# Patient Record
Sex: FEMALE | Race: WHITE | NOT HISPANIC OR LATINO | Employment: OTHER | URBAN - METROPOLITAN AREA
[De-identification: names, ages, dates, MRNs, and addresses within clinical notes are randomized per-mention and may not be internally consistent; named-entity substitution may affect disease eponyms.]

---

## 2017-04-24 ENCOUNTER — ANESTHESIA EVENT (OUTPATIENT)
Dept: PERIOP | Facility: HOSPITAL | Age: 77
End: 2017-04-24

## 2017-04-24 ENCOUNTER — APPOINTMENT (OUTPATIENT)
Dept: PREADMISSION TESTING | Facility: HOSPITAL | Age: 77
End: 2017-04-24

## 2017-04-24 ENCOUNTER — HOSPITAL ENCOUNTER (OUTPATIENT)
Dept: GENERAL RADIOLOGY | Facility: HOSPITAL | Age: 77
Discharge: HOME | End: 2017-04-24
Admitting: UROLOGY

## 2017-04-24 VITALS — BODY MASS INDEX: 21.44 KG/M2 | HEIGHT: 66 IN | WEIGHT: 133.38 LBS

## 2017-04-24 LAB
ANION GAP SERPL CALCULATED.3IONS-SCNC: 5 MMOL/L (ref 3–11)
APTT PPP: 27.8 SECONDS (ref 24–31)
BUN BLD-MCNC: 19 MG/DL (ref 9–23)
BUN/CREAT SERPL: 23.8 (ref 7–25)
CALCIUM SPEC-SCNC: 10.3 MG/DL (ref 8.7–10.4)
CHLORIDE SERPL-SCNC: 98 MMOL/L (ref 99–109)
CO2 SERPL-SCNC: 35 MMOL/L (ref 20–31)
CREAT BLD-MCNC: 0.8 MG/DL (ref 0.6–1.3)
DEPRECATED RDW RBC AUTO: 43.4 FL (ref 37–54)
ERYTHROCYTE [DISTWIDTH] IN BLOOD BY AUTOMATED COUNT: 12.5 % (ref 11.3–14.5)
GFR SERPL CREATININE-BSD FRML MDRD: 70 ML/MIN/1.73
GLUCOSE BLD-MCNC: 91 MG/DL (ref 70–100)
HCT VFR BLD AUTO: 41.9 % (ref 34.5–44)
HGB BLD-MCNC: 14.1 G/DL (ref 11.5–15.5)
INR PPP: 0.98
MCH RBC QN AUTO: 31.9 PG (ref 27–31)
MCHC RBC AUTO-ENTMCNC: 33.7 G/DL (ref 32–36)
MCV RBC AUTO: 94.8 FL (ref 80–99)
PLATELET # BLD AUTO: 301 10*3/MM3 (ref 150–450)
PMV BLD AUTO: 9.5 FL (ref 6–12)
POTASSIUM BLD-SCNC: 4.4 MMOL/L (ref 3.5–5.5)
PROTHROMBIN TIME: 10.7 SECONDS (ref 9.6–11.5)
RBC # BLD AUTO: 4.42 10*6/MM3 (ref 3.89–5.14)
SODIUM BLD-SCNC: 138 MMOL/L (ref 132–146)
WBC NRBC COR # BLD: 8.24 10*3/MM3 (ref 3.5–10.8)

## 2017-04-24 PROCEDURE — 71020 HC CHEST PA AND LATERAL: CPT

## 2017-04-24 PROCEDURE — 36415 COLL VENOUS BLD VENIPUNCTURE: CPT

## 2017-04-24 PROCEDURE — 85730 THROMBOPLASTIN TIME PARTIAL: CPT | Performed by: UROLOGY

## 2017-04-24 PROCEDURE — 85610 PROTHROMBIN TIME: CPT | Performed by: UROLOGY

## 2017-04-24 PROCEDURE — 80048 BASIC METABOLIC PNL TOTAL CA: CPT | Performed by: UROLOGY

## 2017-04-24 PROCEDURE — 85027 COMPLETE CBC AUTOMATED: CPT | Performed by: UROLOGY

## 2017-04-24 RX ORDER — METOPROLOL SUCCINATE 25 MG/1
25 TABLET, EXTENDED RELEASE ORAL DAILY
COMMUNITY

## 2017-04-24 RX ORDER — MV-MN/OM3/DHA/EPA/FISH/LUT/ZEA 250-5-1 MG
1 CAPSULE ORAL DAILY
COMMUNITY

## 2017-04-24 NOTE — PAT
Instructed pt x2 that she would need to remove her ring after she told me she was unable to remove it.  Stressed to her that the ring can no longer be taped like had been done in the past.  Reinforced that the ring would be cut off her finger if needed.  Pt verbalized understanding and stated she would try one more time tonight after her shower.    MEASURED FOR TEDS/SCDS IN PAT    CALF MEASUREMENT    13.5  LENGTH MEASUREMENT 16

## 2017-04-25 ENCOUNTER — ANESTHESIA (OUTPATIENT)
Dept: PERIOP | Facility: HOSPITAL | Age: 77
End: 2017-04-25

## 2017-04-25 ENCOUNTER — HOSPITAL ENCOUNTER (OUTPATIENT)
Facility: HOSPITAL | Age: 77
Discharge: HOME | End: 2017-04-26
Attending: UROLOGY | Admitting: UROLOGY

## 2017-04-25 PROBLEM — Z98.890 H/O VAGINAL SURGERY: Status: ACTIVE | Noted: 2017-04-25

## 2017-04-25 PROBLEM — I34.1 MVP (MITRAL VALVE PROLAPSE): Status: ACTIVE | Noted: 2017-04-25

## 2017-04-25 PROBLEM — N81.10 VAGINAL PROLAPSE: Status: ACTIVE | Noted: 2017-04-25

## 2017-04-25 PROCEDURE — 25010000002 BUPRENORPHINE PER 0.1 MG: Performed by: NURSE ANESTHETIST, CERTIFIED REGISTERED

## 2017-04-25 PROCEDURE — 25010000002 CEFOXITIN PER 1 G: Performed by: UROLOGY

## 2017-04-25 PROCEDURE — C1763 CONN TISS, NON-HUMAN: HCPCS | Performed by: UROLOGY

## 2017-04-25 PROCEDURE — 25010000002 DEXAMETHASONE PER 1 MG: Performed by: NURSE ANESTHETIST, CERTIFIED REGISTERED

## 2017-04-25 PROCEDURE — G0378 HOSPITAL OBSERVATION PER HR: HCPCS

## 2017-04-25 PROCEDURE — 25010000002 ONDANSETRON PER 1 MG: Performed by: NURSE ANESTHETIST, CERTIFIED REGISTERED

## 2017-04-25 PROCEDURE — 25010000002 FENTANYL CITRATE (PF) 100 MCG/2ML SOLUTION: Performed by: NURSE ANESTHETIST, CERTIFIED REGISTERED

## 2017-04-25 PROCEDURE — 25010000002 ONDANSETRON PER 1 MG: Performed by: UROLOGY

## 2017-04-25 PROCEDURE — 25010000002 CEFOXITIN: Performed by: UROLOGY

## 2017-04-25 PROCEDURE — 25010000002 DEXAMETHASONE SODIUM PHOSPHATE 10 MG/ML SOLUTION 1 ML VIAL: Performed by: NURSE ANESTHETIST, CERTIFIED REGISTERED

## 2017-04-25 PROCEDURE — 25010000002 PROPOFOL 10 MG/ML EMULSION: Performed by: NURSE ANESTHETIST, CERTIFIED REGISTERED

## 2017-04-25 PROCEDURE — 25010000002 NEOSTIGMINE 10 MG/10ML SOLUTION: Performed by: NURSE ANESTHETIST, CERTIFIED REGISTERED

## 2017-04-25 DEVICE — TRADITIONAL Y MESH KIT
Type: IMPLANTABLE DEVICE | Site: PELVIS | Status: FUNCTIONAL
Brand: UPSYLON™ Y MESH KIT

## 2017-04-25 RX ORDER — MORPHINE SULFATE 4 MG/ML
4 INJECTION, SOLUTION INTRAMUSCULAR; INTRAVENOUS
Status: DISCONTINUED | OUTPATIENT
Start: 2017-04-25 | End: 2017-04-26 | Stop reason: HOSPADM

## 2017-04-25 RX ORDER — DEXAMETHASONE SODIUM PHOSPHATE 4 MG/ML
INJECTION, SOLUTION INTRA-ARTICULAR; INTRALESIONAL; INTRAMUSCULAR; INTRAVENOUS; SOFT TISSUE AS NEEDED
Status: DISCONTINUED | OUTPATIENT
Start: 2017-04-25 | End: 2017-04-25 | Stop reason: SURG

## 2017-04-25 RX ORDER — PROMETHAZINE HYDROCHLORIDE 25 MG/1
25 SUPPOSITORY RECTAL ONCE AS NEEDED
Status: DISCONTINUED | OUTPATIENT
Start: 2017-04-25 | End: 2017-04-25 | Stop reason: HOSPADM

## 2017-04-25 RX ORDER — HYDRALAZINE HYDROCHLORIDE 20 MG/ML
10 INJECTION INTRAMUSCULAR; INTRAVENOUS EVERY 6 HOURS PRN
Status: DISCONTINUED | OUTPATIENT
Start: 2017-04-25 | End: 2017-04-26 | Stop reason: HOSPADM

## 2017-04-25 RX ORDER — PANTOPRAZOLE SODIUM 40 MG/10ML
40 INJECTION, POWDER, LYOPHILIZED, FOR SOLUTION INTRAVENOUS
Status: DISCONTINUED | OUTPATIENT
Start: 2017-04-26 | End: 2017-04-26 | Stop reason: HOSPADM

## 2017-04-25 RX ORDER — PROMETHAZINE HYDROCHLORIDE 25 MG/ML
6.25 INJECTION, SOLUTION INTRAMUSCULAR; INTRAVENOUS ONCE AS NEEDED
Status: DISCONTINUED | OUTPATIENT
Start: 2017-04-25 | End: 2017-04-25

## 2017-04-25 RX ORDER — ONDANSETRON 4 MG/1
4 TABLET, FILM COATED ORAL EVERY 6 HOURS PRN
Status: DISCONTINUED | OUTPATIENT
Start: 2017-04-25 | End: 2017-04-26 | Stop reason: HOSPADM

## 2017-04-25 RX ORDER — OXYCODONE HYDROCHLORIDE AND ACETAMINOPHEN 5; 325 MG/1; MG/1
1 TABLET ORAL EVERY 4 HOURS PRN
Status: DISCONTINUED | OUTPATIENT
Start: 2017-04-25 | End: 2017-04-26 | Stop reason: HOSPADM

## 2017-04-25 RX ORDER — SODIUM CHLORIDE 0.9 % (FLUSH) 0.9 %
1-10 SYRINGE (ML) INJECTION AS NEEDED
Status: DISCONTINUED | OUTPATIENT
Start: 2017-04-25 | End: 2017-04-25 | Stop reason: HOSPADM

## 2017-04-25 RX ORDER — ONDANSETRON 2 MG/ML
4 INJECTION INTRAMUSCULAR; INTRAVENOUS EVERY 6 HOURS PRN
Status: DISCONTINUED | OUTPATIENT
Start: 2017-04-25 | End: 2017-04-26 | Stop reason: HOSPADM

## 2017-04-25 RX ORDER — PROMETHAZINE HYDROCHLORIDE 25 MG/1
25 TABLET ORAL ONCE AS NEEDED
Status: DISCONTINUED | OUTPATIENT
Start: 2017-04-25 | End: 2017-04-25 | Stop reason: HOSPADM

## 2017-04-25 RX ORDER — FENTANYL CITRATE 50 UG/ML
INJECTION, SOLUTION INTRAMUSCULAR; INTRAVENOUS AS NEEDED
Status: DISCONTINUED | OUTPATIENT
Start: 2017-04-25 | End: 2017-04-25 | Stop reason: SURG

## 2017-04-25 RX ORDER — NALOXONE HCL 0.4 MG/ML
0.4 VIAL (ML) INJECTION
Status: DISCONTINUED | OUTPATIENT
Start: 2017-04-25 | End: 2017-04-26 | Stop reason: HOSPADM

## 2017-04-25 RX ORDER — SODIUM CHLORIDE, SODIUM LACTATE, POTASSIUM CHLORIDE, CALCIUM CHLORIDE 600; 310; 30; 20 MG/100ML; MG/100ML; MG/100ML; MG/100ML
9 INJECTION, SOLUTION INTRAVENOUS CONTINUOUS
Status: DISCONTINUED | OUTPATIENT
Start: 2017-04-25 | End: 2017-04-26 | Stop reason: HOSPADM

## 2017-04-25 RX ORDER — ONDANSETRON 2 MG/ML
4 INJECTION INTRAMUSCULAR; INTRAVENOUS ONCE AS NEEDED
Status: DISCONTINUED | OUTPATIENT
Start: 2017-04-25 | End: 2017-04-25 | Stop reason: HOSPADM

## 2017-04-25 RX ORDER — LIDOCAINE HYDROCHLORIDE 10 MG/ML
1 INJECTION, SOLUTION EPIDURAL; INFILTRATION; INTRACAUDAL; PERINEURAL ONCE
Status: COMPLETED | OUTPATIENT
Start: 2017-04-25 | End: 2017-04-25

## 2017-04-25 RX ORDER — PROPOFOL 10 MG/ML
VIAL (ML) INTRAVENOUS AS NEEDED
Status: DISCONTINUED | OUTPATIENT
Start: 2017-04-25 | End: 2017-04-25 | Stop reason: SURG

## 2017-04-25 RX ORDER — FAMOTIDINE 20 MG/1
20 TABLET, FILM COATED ORAL ONCE
Status: COMPLETED | OUTPATIENT
Start: 2017-04-25 | End: 2017-04-25

## 2017-04-25 RX ORDER — LIDOCAINE HYDROCHLORIDE 10 MG/ML
INJECTION, SOLUTION EPIDURAL; INFILTRATION; INTRACAUDAL; PERINEURAL AS NEEDED
Status: DISCONTINUED | OUTPATIENT
Start: 2017-04-25 | End: 2017-04-25 | Stop reason: SURG

## 2017-04-25 RX ORDER — SODIUM CHLORIDE 9 MG/ML
INJECTION, SOLUTION INTRAVENOUS AS NEEDED
Status: DISCONTINUED | OUTPATIENT
Start: 2017-04-25 | End: 2017-04-25 | Stop reason: HOSPADM

## 2017-04-25 RX ORDER — NEOSTIGMINE METHYLSULFATE 1 MG/ML
INJECTION, SOLUTION INTRAVENOUS AS NEEDED
Status: DISCONTINUED | OUTPATIENT
Start: 2017-04-25 | End: 2017-04-25 | Stop reason: SURG

## 2017-04-25 RX ORDER — SODIUM CHLORIDE 9 MG/ML
100 INJECTION, SOLUTION INTRAVENOUS CONTINUOUS
Status: DISCONTINUED | OUTPATIENT
Start: 2017-04-25 | End: 2017-04-26 | Stop reason: HOSPADM

## 2017-04-25 RX ORDER — METOPROLOL SUCCINATE 25 MG/1
25 TABLET, EXTENDED RELEASE ORAL DAILY
Status: DISCONTINUED | OUTPATIENT
Start: 2017-04-26 | End: 2017-04-26 | Stop reason: HOSPADM

## 2017-04-25 RX ORDER — DOCUSATE SODIUM 100 MG/1
100 CAPSULE, LIQUID FILLED ORAL 2 TIMES DAILY PRN
Status: DISCONTINUED | OUTPATIENT
Start: 2017-04-25 | End: 2017-04-26 | Stop reason: HOSPADM

## 2017-04-25 RX ORDER — GLYCOPYRROLATE 0.2 MG/ML
INJECTION INTRAMUSCULAR; INTRAVENOUS AS NEEDED
Status: DISCONTINUED | OUTPATIENT
Start: 2017-04-25 | End: 2017-04-25 | Stop reason: SURG

## 2017-04-25 RX ORDER — FENTANYL CITRATE 50 UG/ML
50 INJECTION, SOLUTION INTRAMUSCULAR; INTRAVENOUS
Status: DISCONTINUED | OUTPATIENT
Start: 2017-04-25 | End: 2017-04-25 | Stop reason: HOSPADM

## 2017-04-25 RX ORDER — FAMOTIDINE 10 MG/ML
20 INJECTION, SOLUTION INTRAVENOUS ONCE
Status: DISCONTINUED | OUTPATIENT
Start: 2017-04-25 | End: 2017-04-25 | Stop reason: HOSPADM

## 2017-04-25 RX ORDER — ONDANSETRON 2 MG/ML
INJECTION INTRAMUSCULAR; INTRAVENOUS AS NEEDED
Status: DISCONTINUED | OUTPATIENT
Start: 2017-04-25 | End: 2017-04-25 | Stop reason: SURG

## 2017-04-25 RX ORDER — MAGNESIUM HYDROXIDE 1200 MG/15ML
LIQUID ORAL AS NEEDED
Status: DISCONTINUED | OUTPATIENT
Start: 2017-04-25 | End: 2017-04-25 | Stop reason: HOSPADM

## 2017-04-25 RX ORDER — ATRACURIUM BESYLATE 10 MG/ML
INJECTION, SOLUTION INTRAVENOUS AS NEEDED
Status: DISCONTINUED | OUTPATIENT
Start: 2017-04-25 | End: 2017-04-25 | Stop reason: SURG

## 2017-04-25 RX ADMIN — DEXAMETHASONE SODIUM PHOSPHATE 60 ML: 10 INJECTION, SOLUTION INTRAMUSCULAR; INTRAVENOUS at 12:00

## 2017-04-25 RX ADMIN — FAMOTIDINE 20 MG: 20 TABLET ORAL at 10:28

## 2017-04-25 RX ADMIN — ATRACURIUM BESYLATE 50 MG: 10 INJECTION, SOLUTION INTRAVENOUS at 11:56

## 2017-04-25 RX ADMIN — CEFOXITIN SODIUM 1 G: 1 POWDER, FOR SOLUTION INTRAVENOUS at 12:00

## 2017-04-25 RX ADMIN — SODIUM CHLORIDE 100 ML/HR: 9 INJECTION, SOLUTION INTRAVENOUS at 14:18

## 2017-04-25 RX ADMIN — SODIUM CHLORIDE, POTASSIUM CHLORIDE, SODIUM LACTATE AND CALCIUM CHLORIDE 9 ML/HR: 600; 310; 30; 20 INJECTION, SOLUTION INTRAVENOUS at 10:28

## 2017-04-25 RX ADMIN — LIDOCAINE HYDROCHLORIDE 60 MG: 10 INJECTION, SOLUTION EPIDURAL; INFILTRATION; INTRACAUDAL; PERINEURAL at 11:56

## 2017-04-25 RX ADMIN — PROPOFOL 150 MG: 10 INJECTION, EMULSION INTRAVENOUS at 11:56

## 2017-04-25 RX ADMIN — FENTANYL CITRATE 250 MCG: 50 INJECTION, SOLUTION INTRAMUSCULAR; INTRAVENOUS at 11:56

## 2017-04-25 RX ADMIN — ONDANSETRON 4 MG: 2 INJECTION INTRAMUSCULAR; INTRAVENOUS at 12:55

## 2017-04-25 RX ADMIN — SODIUM CHLORIDE, POTASSIUM CHLORIDE, SODIUM LACTATE AND CALCIUM CHLORIDE: 600; 310; 30; 20 INJECTION, SOLUTION INTRAVENOUS at 12:10

## 2017-04-25 RX ADMIN — DEXAMETHASONE SODIUM PHOSPHATE 4 MG: 4 INJECTION, SOLUTION INTRAMUSCULAR; INTRAVENOUS at 12:00

## 2017-04-25 RX ADMIN — LIDOCAINE HYDROCHLORIDE: 10 INJECTION, SOLUTION EPIDURAL; INFILTRATION; INTRACAUDAL; PERINEURAL at 10:29

## 2017-04-25 RX ADMIN — ROBINUL 0.2 MG: 0.2 INJECTION INTRAMUSCULAR; INTRAVENOUS at 13:05

## 2017-04-25 RX ADMIN — NEOSTIGMINE METHYLSULFATE 2 MG: 1 INJECTION, SOLUTION INTRAVENOUS at 13:05

## 2017-04-25 RX ADMIN — ONDANSETRON 4 MG: 2 INJECTION INTRAMUSCULAR; INTRAVENOUS at 17:05

## 2017-04-25 RX ADMIN — CEFOXITIN SODIUM 1 G: 1 POWDER, FOR SOLUTION INTRAVENOUS at 20:08

## 2017-04-25 NOTE — ANESTHESIA PROCEDURE NOTES
"Peripheral Block    Patient location during procedure: pre-op  Reason for block: at surgeon's request and post-op pain management  Preanesthetic Checklist  Completed: patient identified, site marked, surgical consent, pre-op evaluation, timeout performed, IV checked, risks and benefits discussed and monitors and equipment checked  Peripheral Block Prep:  Sterile barriers:cap, gloves, sterile barriers and mask  Prep: ChloraPrep  Patient monitoring: blood pressure monitoring, continuous pulse oximetry and EKG  Peripheral Procedure  Nursing cardiac assessment comments yes: Sedation, GA, Spinal,Epidural   Guidance:ultrasound guided  Images:still images obtained    Laterality:Bilateral  Block Type:TAP  Injection Technique:single-shot  Needle Type:short-bevel  Needle Gauge:20 G  Needle gauge: 20g 4\" Stimuplex.   Medications  Comment:Block Injection:  LA dose divided between Right and Left block       Adjuncts:  Decadron 4mg PSF, Buprenex 0.3mg (Per total volume of LA)  Local Injected:bupivacaine 0.25% without epinephrine Local Amount Injected:60mL  Post Assessment  Injection Assessment: negative aspiration for heme, incremental injection and no paresthesia on injection  Patient Tolerance:comfortable throughout block  Complications:no  Additional Notes  The pt was placed in the Supine Position and was  anesthetized with:       gedneral anesthesia.  Under Ultrasound guidance, a BBraun 4inch 360 degree needle was advanced with Normal Saline hydro dissection of tissue.  The Internal Oblique and Transversus Abdominus muscles where visualized.  At or before the aponeurosis of Internal Oblique, local anesthetic spread was visualized in the Transversus Abdominus Plane. Injection was made incrementally with aspiration every 5 mls.  There was no  intravascular injection,  injection pressure was normal, there was no neural injection, and the procedure was completed without difficulty.  Thank You.            "

## 2017-04-25 NOTE — ANESTHESIA POSTPROCEDURE EVALUATION
Patient: Veronica Mirza    Procedure Summary     Date Anesthesia Start Anesthesia Stop Room / Location    04/25/17 1154 1324 [facility] / [facility]       Procedure Diagnosis Surgeon Provider    SACROCOLPOPEXY LAPAROSCOPIC WITH DAVINCI ROBOT (N/A Abdomen) No diagnosis on file. MD Wing Solis Jr., MD          Anesthesia Type: general  Last vitals  BP   131/67   Temp   97.3   Pulse  68   Resp   16   SpO2   100     Post Anesthesia Care and Evaluation    Patient location during evaluation: PACU  Patient participation: complete - patient participated  Level of consciousness: awake and alert  Pain score: 0  Pain management: adequate  Airway patency: patent  Anesthetic complications: No anesthetic complications  PONV Status: none  Cardiovascular status: hemodynamically stable and acceptable  Respiratory status: nonlabored ventilation, acceptable and nasal cannula  Hydration status: acceptable

## 2017-04-25 NOTE — H&P
Admission HP     Patient Name: Veronica Mirza  MRN: [id number]  : 1940  DOS: 2017    Attending: Chester Melton Jr.*    Primary Care Provider: Soo Augustine MD      Patient Care Team:  Soo Augustine MD as PCP - General (Family Medicine)    Chief complaint:  vaginal prolapse    Subjective   Patient is a 76 y.o. female presented for sacrocolpopexy by Dr. Linh Dowd under GA. She tolerated surgery well and is admitted for further medical management.    When seen in PACU she is doing well. Her pain is well controlled. She denies nausea, shortness of breath or chest pain. No hx of DVT or PE.    Seen later in her room, doing ok. No f/c/n/vom. Some incisional pain.wy    Allergies:  No Known Allergies    Meds:  Prescriptions Prior to Admission   Medication Sig Dispense Refill Last Dose   • metoprolol succinate XL (TOPROL-XL) 25 MG 24 hr tablet Take 25 mg by mouth Daily.   2017 at 0700   • Calcium-Iron-Vit D-Vit K (CALCIUM SOFT CHEWS) 139-0-4605-40 MG-UNT-MCG chewable tablet Chew 1 tablet 2 (Two) Times a Day.      • Cyanocobalamin (VITAMELTS ENERGY VITAMIN B-12) 1500 MCG tablet dispersible Take 1 tablet by mouth Daily.      • Glucosamine-Chondroit-Vit C-Mn (GLUCOSAMINE CHONDR 1500 COMPLX PO) Take 1 tablet by mouth 3 (Three) Times a Day.      • Multiple Vitamins-Minerals (OCUVITE ADULT 50+) capsule Take 1 tablet by mouth Daily.          History:   Past Medical History:   Diagnosis Date   • Glaucoma    • Irregular heartbeat     seen by dr Chase for years and then discharged from him around ; po meds prescribed by family md; no problems    • Macular degeneration    • MVP (mitral valve prolapse)     takes prophylactic antibiotics before dental procedures   • Pelvic prolapse     several procedures attempted    • Retinal and vitreous disorder     left eye-receives shots (10 so far)     Past Surgical History:   Procedure Laterality Date   • BLADDER REPAIR      x2   • BREAST BIOPSY Left     x2    • CATARACT EXTRACTION     • PARTIAL HYSTERECTOMY     • TOTAL KNEE ARTHROPLASTY Right    • TUBAL ABDOMINAL LIGATION       History reviewed. No pertinent family history.  Social History   Substance Use Topics   • Smoking status: Never Smoker   • Smokeless tobacco: Never Used   • Alcohol use No   . 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    Review of Systems  Pertinent items are noted in HPI, all other systems reviewed and negative    Vital Signs  /70 (BP Location: Right arm, Patient Position: Lying)  Pulse 57  Temp 97.4 °F (36.3 °C) (Temporal Artery )   Resp 20  SpO2 96%    Physical Exam:    General Appearance:    Alert, cooperative, in no acute distress   Head:    Normocephalic, without obvious abnormality, atraumatic   Eyes:            Lids and lashes normal, conjunctivae and sclerae normal, no   icterus, no pallor, corneas clear, PERRLA   Ears:    Ears appear intact with no abnormalities noted   Neck:   No adenopathy, supple, trachea midline, no thyromegaly, no     carotid bruit, no JVD   Lungs:     Clear to auscultation,respirations regular, even and                   unlabored    Heart:    Regular rhythm and normal rate, normal S1 and S2, no            murmur, no gallop, no rub, no click   Abdomen:     Abd soft, non-tender. Lap sites CDI and well approximated   Genitalia:    Deferred. Harper   Extremities:   Moves all extremities well, no edema, no cyanosis, no              redness   Pulses:   Pulses palpable and equal bilaterally   Skin:   No bleeding, bruising or rash   Neurologic:   Cranial nerves 2 - 12 grossly intact, sensation intact       Results from last 7 days  Lab Units 04/24/17  1058   WBC 10*3/mm3 8.24   HEMOGLOBIN g/dL 14.1   HEMATOCRIT % 41.9   PLATELETS 10*3/mm3 301       Results from last 7 days  Lab Units 04/24/17  1058   SODIUM mmol/L 138   POTASSIUM mmol/L 4.4   CHLORIDE mmol/L 98*   TOTAL CO2 mmol/L 35.0*   BUN mg/dL 19   CREATININE mg/dL 0.80   CALCIUM mg/dL 10.3   GLUCOSE mg/dL 91      Assessment and Plan:   Principal Problem:    S/P sacrocolpopexy  Active Problems:    Vaginal prolapse    MVP (mitral valve prolapse)      Plan  1. Ambulation, to start in am.   2. Pain control-prns   3. IS-encourage  4. DVT proph- mechs/Lovenox  5. Bowel regimen  6. Resume home medications per Dr. Linh Dowd  7. Monitor post-op labs  8. DC planning   9. Diet, advance as tolerated from clear liquids./IVF per surgeon  Removed Harper in am, along with packing.     Noelle Tsai, LOUANN  04/25/17  3:10 PM   Seen and examined by me. Agree with above. Discussed with patient.

## 2017-04-25 NOTE — ANESTHESIA PREPROCEDURE EVALUATION
Anesthesia Evaluation     Patient summary reviewed and Nursing notes reviewed      Airway   Mallampati: II  TM distance: >3 FB  Neck ROM: full  no difficulty expected  Dental      Pulmonary - negative pulmonary ROS   Cardiovascular     (+) valvular problems/murmurs,       Neuro/Psych- negative ROS  GI/Hepatic/Renal/Endo - negative ROS     Musculoskeletal (-) negative ROS    Abdominal    Substance History - negative use     OB/GYN negative ob/gyn ROS         Other                                    Anesthesia Plan    ASA 2     general   (TAP)  intravenous induction   Anesthetic plan and risks discussed with patient.    Plan discussed with CRNA.

## 2017-04-25 NOTE — ANESTHESIA PROCEDURE NOTES
Airway  Airway not difficult    General Information and Staff    Patient location during procedure: OR    Indications and Patient Condition  Indications for airway management: airway protection    Preoxygenated: yes  MILS not maintained throughout  Mask difficulty assessment: 1 - vent by mask    Final Airway Details  Final airway type: endotracheal airway      Successful airway: ETT  Cuffed: yes   Successful intubation technique: direct laryngoscopy  Endotracheal tube insertion site: oral  Blade: Kelly  Blade size: #3  ETT size: 6.5 mm  Cormack-Lehane Classification: grade I - full view of glottis  Placement verified by: chest auscultation and capnometry   Number of attempts at approach: 1    Additional Comments  Negative epigastric sounds, Breath sound equal bilaterally with symmetric chest rise and fall

## 2017-04-26 VITALS
WEIGHT: 133.6 LBS | TEMPERATURE: 97.8 F | HEART RATE: 65 BPM | BODY MASS INDEX: 21.47 KG/M2 | HEIGHT: 66 IN | DIASTOLIC BLOOD PRESSURE: 55 MMHG | RESPIRATION RATE: 18 BRPM | SYSTOLIC BLOOD PRESSURE: 96 MMHG | OXYGEN SATURATION: 97 %

## 2017-04-26 PROBLEM — D72.829 LEUKOCYTOSIS: Status: ACTIVE | Noted: 2017-04-26

## 2017-04-26 LAB
ANION GAP SERPL CALCULATED.3IONS-SCNC: 3 MMOL/L (ref 3–11)
BUN BLD-MCNC: 16 MG/DL (ref 9–23)
BUN/CREAT SERPL: 20 (ref 7–25)
CALCIUM SPEC-SCNC: 8.9 MG/DL (ref 8.7–10.4)
CHLORIDE SERPL-SCNC: 103 MMOL/L (ref 99–109)
CO2 SERPL-SCNC: 30 MMOL/L (ref 20–31)
CREAT BLD-MCNC: 0.8 MG/DL (ref 0.6–1.3)
DEPRECATED RDW RBC AUTO: 45.4 FL (ref 37–54)
ERYTHROCYTE [DISTWIDTH] IN BLOOD BY AUTOMATED COUNT: 12.9 % (ref 11.3–14.5)
GFR SERPL CREATININE-BSD FRML MDRD: 70 ML/MIN/1.73
GLUCOSE BLD-MCNC: 134 MG/DL (ref 70–100)
HCT VFR BLD AUTO: 40 % (ref 34.5–44)
HGB BLD-MCNC: 12.9 G/DL (ref 11.5–15.5)
MCH RBC QN AUTO: 31.5 PG (ref 27–31)
MCHC RBC AUTO-ENTMCNC: 32.3 G/DL (ref 32–36)
MCV RBC AUTO: 97.6 FL (ref 80–99)
PLATELET # BLD AUTO: 273 10*3/MM3 (ref 150–450)
PMV BLD AUTO: 9.7 FL (ref 6–12)
POTASSIUM BLD-SCNC: 5 MMOL/L (ref 3.5–5.5)
RBC # BLD AUTO: 4.1 10*6/MM3 (ref 3.89–5.14)
SODIUM BLD-SCNC: 136 MMOL/L (ref 132–146)
WBC NRBC COR # BLD: 14.25 10*3/MM3 (ref 3.5–10.8)

## 2017-04-26 PROCEDURE — 80048 BASIC METABOLIC PNL TOTAL CA: CPT | Performed by: UROLOGY

## 2017-04-26 PROCEDURE — 25010000002 ENOXAPARIN PER 10 MG: Performed by: UROLOGY

## 2017-04-26 PROCEDURE — 85027 COMPLETE CBC AUTOMATED: CPT | Performed by: UROLOGY

## 2017-04-26 PROCEDURE — G0378 HOSPITAL OBSERVATION PER HR: HCPCS

## 2017-04-26 PROCEDURE — 25010000002 CEFOXITIN: Performed by: UROLOGY

## 2017-04-26 RX ORDER — PSEUDOEPHEDRINE HCL 30 MG
100 TABLET ORAL 2 TIMES DAILY PRN
Refills: 0
Start: 2017-04-26

## 2017-04-26 RX ORDER — HYDROCODONE BITARTRATE AND ACETAMINOPHEN 7.5; 325 MG/1; MG/1
1 TABLET ORAL EVERY 6 HOURS PRN
Refills: 0
Start: 2017-04-26

## 2017-04-26 RX ORDER — NITROFURANTOIN 25; 75 MG/1; MG/1
100 CAPSULE ORAL 2 TIMES DAILY
Refills: 0
Start: 2017-04-26

## 2017-04-26 RX ADMIN — ENOXAPARIN SODIUM 30 MG: 30 INJECTION SUBCUTANEOUS at 08:38

## 2017-04-26 RX ADMIN — SODIUM CHLORIDE 100 ML/HR: 9 INJECTION, SOLUTION INTRAVENOUS at 11:11

## 2017-04-26 RX ADMIN — PANTOPRAZOLE SODIUM 40 MG: 40 INJECTION, POWDER, FOR SOLUTION INTRAVENOUS at 06:19

## 2017-04-26 RX ADMIN — CEFOXITIN SODIUM 1 G: 1 POWDER, FOR SOLUTION INTRAVENOUS at 03:13

## 2017-04-26 RX ADMIN — SODIUM CHLORIDE 100 ML/HR: 9 INJECTION, SOLUTION INTRAVENOUS at 00:20

## 2017-04-26 RX ADMIN — METOPROLOL SUCCINATE 25 MG: 25 TABLET, EXTENDED RELEASE ORAL at 08:38

## 2017-04-26 NOTE — DISCHARGE SUMMARY
Patient Name: Veronica Mirza  MRN: [id number]  : 1940  DOS: 2017    Attending: Vanesa Patel MD    Primary Care Provider: Soo Augustine MD    Date of Admission:.2017  9:09 AM    Date of Discharge:  2017    Discharge Diagnosis: Principal Problem:    S/P sacrocolpopexy  Active Problems:    Vaginal prolapse    MVP (mitral valve prolapse)    Leukocytosis, mild, likely reactive      Hospital Course  Patient is a 76 y.o. female presented for sacrocolpopexy by Dr. Linh Dowd under GA. She tolerated surgery well and was admitted for further medical management.    She was provided pain medication as needed for pain control.  She received DVT prophylaxis with subcutaneous Lovenox as well as mechanicals    She used an IS for atelectasis prophylaxis.    During her stay, her diet was advanced and she tolerated advancing diet well.  She was provided a bowel regimen and was encouraged to ambulate frequently which her did.    Home medications were resumed as appropriate, and labs were monitored and remained fairly stable.   Her lazo catheter was discontinued and she voids without difficulty.    With the progress she has made, she is ready for DC home today.    Discussed with patient regarding plan and she shows understanding and agreement.       Procedures Performed  DATE OF PROCEDURE:  2017     PREOPERATIVE DIAGNOSIS: Vaginal prolapse.     POSTOPERATIVE DIAGNOSIS: Vaginal prolapse.     PROCEDURES PERFORMED:  1. Robotic-assisted laparoscopic sacral colpopexy with mesh.   2. Cystoscopy.      SURGEON: Chester Melton Jr., MD        Pertinent Test Results:    I reviewed the patient's new clinical results.     Results from last 7 days  Lab Units 17  0547 17  1058   WBC 10*3/mm3 14.25* 8.24   HEMOGLOBIN g/dL 12.9 14.1   HEMATOCRIT % 40.0 41.9   PLATELETS 10*3/mm3 273 301       Results from last 7 days  Lab Units 17  0547 17  1058   SODIUM mmol/L 136 138   POTASSIUM  "mmol/L 5.0 4.4   CHLORIDE mmol/L 103 98*   TOTAL CO2 mmol/L 30.0 35.0*   BUN mg/dL 16 19   CREATININE mg/dL 0.80 0.80   CALCIUM mg/dL 8.9 10.3   GLUCOSE mg/dL 134* 91     I reviewed the patient's new imaging including images and reports.    Discharge Assessment:    Vital Signs  /56  Pulse 60  Temp 97.7 °F (36.5 °C) (Oral)   Resp 18  Ht 65.5\" (166.4 cm)  Wt 133 lb 9.6 oz (60.6 kg)  SpO2 98%  BMI 21.89 kg/m2  Temp (24hrs), Av.8 °F (36.6 °C), Min:97.3 °F (36.3 °C), Max:98.6 °F (37 °C)      General Appearance:    Alert, cooperative, in no acute distress   Lungs:     Clear to auscultation,respirations regular, even and                   unlabored    Heart:    Regular rhythm and normal rate, normal S1 and S2   Abdomen:     Abd soft, non-tender. Lap sites CDI and well approximated   Extremities:   Moves all extremities well, no edema, no cyanosis, no              redness   Pulses:   Pulses palpable and equal bilaterally   Skin:   No bleeding, bruising or rash   Neurologic:   Cranial nerves 2 - 12 grossly intact, sensation intact       Discharge Disposition: Home    Discharge Medications   Veronica Mirza   Home Medication Instructions [id number]    Printed on:17 0958   Medication Information                      Calcium-Iron-Vit D-Vit K (CALCIUM SOFT CHEWS) 829-4-5689-40 MG-UNT-MCG chewable tablet  Chew 1 tablet 2 (Two) Times a Day.             Cyanocobalamin (VITAMELTS ENERGY VITAMIN B-12) 1500 MCG tablet dispersible  Take 1 tablet by mouth Daily.             docusate sodium 100 MG capsule  Take 100 mg by mouth 2 (Two) Times a Day As Needed for Constipation.             Glucosamine-Chondroit-Vit C-Mn (GLUCOSAMINE CHONDR 1500 COMPLX PO)  Take 1 tablet by mouth 3 (Three) Times a Day.             HYDROcodone-acetaminophen (NORCO) 7.5-325 MG per tablet  Take 1 tablet by mouth Every 6 (Six) Hours As Needed for Moderate Pain (4-6).             metoprolol succinate XL (TOPROL-XL) 25 MG 24 hr " tablet  Take 25 mg by mouth Daily.             Multiple Vitamins-Minerals (OCUVITE ADULT 50+) capsule  Take 1 tablet by mouth Daily.             nitrofurantoin, macrocrystal-monohydrate, (MACROBID) 100 MG capsule  Take 1 capsule by mouth 2 (Two) Times a Day.                 Discharge Diet: regular diet    Activity at Discharge: ambulate    Follow-up Appointments  Dr. Linh Dowd per his orders    Discharge took over 30 min.    LOUANN Klein  04/26/17  9:58 AM   Seen and examined by me. Agree with above. Discussed with patient.

## 2017-04-26 NOTE — PROGRESS NOTES
Pharmacy to look over medications on the patients profile that may cause urinary retention.  All meds were looked at: The only medications that may cause this are pain medications such as morphine. The medications that the patient received yesterday during surgery may be still causing some slight urinary retention.  Rylan Mackey [facility]  4/26/2017  11:53 AM

## 2017-04-26 NOTE — PROGRESS NOTES
"Urology    Patient Name: Veronica Mirza  Medical Record Number: [id number]  YOB: 1940     LOS: 0 days   Patient Care Team:  Soo Augustine MD as PCP - General (Family Medicine)    Chief Complaint:  No chief complaint on file.      Subjective     Interval History:     Doing well this morning   pain is well controlled  Harper catheter and vaginal pack of been removed    Review of Systems:    The following systems were reviewed and negative;  constitution, respiratory, cardiovascular, gastrointestinal and genitourinary    Objective     Vital Signs  /72 (BP Location: Right arm, Patient Position: Lying)  Pulse 76  Temp 98 °F (36.7 °C) (Oral)   Resp 18  Ht 65.5\" (166.4 cm)  Wt 133 lb 9.6 oz (60.6 kg)  SpO2 97%  BMI 21.89 kg/m2  I/O last 3 completed shifts:  In: 3153.3 [P.O.:650; I.V.:2503.3]  Out: 2300 [Urine:2250; Blood:50]         Physical Exam:  General Appearance: no apparent distress  Back: No No kyphosis present, no scoliosis present,no tenderness to percussion or Palpation  Lungs: Respirations regular, even and  unlabored  Heart: Regular rhythm and normal rate  Chest Wall: No abnormalities observed  Abdomen: benign, incisions look good  Genital: normal  Rectal: deferred  Extremities: Moves all extremities well, no edemam no cyanosis, no redness  Pulses: Pulses palpable  Skin: No bleeding, bruising or rash  Lymph nodes: No palpable adenopathy  Neurologic: Neurologically grossly intact     Results Review:     I reviewed the patient's new clinical results.  Lab Results (last 24 hours)     Procedure Component Value Units Date/Time    CBC (No Diff) [[id number]] Collected:  04/26/17 0547    Specimen:  Blood Updated:  04/26/17 0557    Basic Metabolic Panel [[id number]]  (Abnormal) Collected:  04/26/17 0547    Specimen:  Blood Updated:  04/26/17 0636     Glucose 134 (H) mg/dL      BUN 16 mg/dL      Creatinine 0.80 mg/dL      Sodium 136 mmol/L      Potassium 5.0 mmol/L      Chloride 103 mmol/L  "     CO2 30.0 mmol/L      Calcium 8.9 mg/dL      eGFR Non African Amer 70 mL/min/1.73      BUN/Creatinine Ratio 20.0     Anion Gap 3.0 mmol/L     Narrative:       [employer] Guidelines    Stage     Description        GFR  1         Normal or High     90+  2         Mild decrease      60-89  3         Moderate decrease  30-59  4         Severe decrease    15-29  5         Kidney failure     <15          Medication Review:    Current Facility-Administered Medications:   •  docusate sodium (COLACE) capsule 100 mg, 100 mg, Oral, BID PRN, Chester Melton Jr., MD  •  enoxaparin (LOVENOX) syringe 30 mg, 30 mg, Subcutaneous, Daily, Chester Melton Jr., MD  •  hydrALAZINE (APRESOLINE) injection 10 mg, 10 mg, Intravenous, Q6H PRN, LOUANN Klein  •  lactated ringers infusion, 9 mL/hr, Intravenous, Continuous, Wing Machuca MD, Last Rate: 9 mL/hr at 04/25/17 1028  •  metoprolol succinate XL (TOPROL-XL) 24 hr tablet 25 mg, 25 mg, Oral, Daily, Chester Melton Jr., MD  •  Morphine sulfate (PF) injection 4 mg, 4 mg, Intravenous, Q2H PRN **AND** naloxone (NARCAN) injection 0.4 mg, 0.4 mg, Intravenous, Q5 Min PRN, Chester Melton Jr., MD  •  ondansetron (ZOFRAN) tablet 4 mg, 4 mg, Oral, Q6H PRN **OR** ondansetron (ZOFRAN) injection 4 mg, 4 mg, Intravenous, Q6H PRN, Chester Melton Jr., MD, 4 mg at 04/25/17 1705  •  ondansetron (ZOFRAN) injection 4 mg, 4 mg, Intravenous, Q6H PRN, LOUANN Klein  •  oxyCODONE-acetaminophen (PERCOCET) 5-325 MG per tablet 1 tablet, 1 tablet, Oral, Q4H PRN, Chester Melton Jr., MD  •  pantoprazole (PROTONIX) injection 40 mg, 40 mg, Intravenous, Q AM, Chester Melton Jr., MD, 40 mg at 04/26/17 0619  •  sodium chloride 0.9 % bolus 500 mL, 500 mL, Intravenous, TID PRN, LOUANN Klein  •  sodium chloride 0.9 % infusion, 100 mL/hr, Intravenous, Continuous, Chester Melton Jr., MD, Last Rate: 100 mL/hr at 04/26/17 0020, 100 mL/hr at  04/26/17 0020    Assessment and Plan    Doing well  Ambulate  Possibly home later today    Principal Problem:    S/P sacrocolpopexy  Active Problems:    Vaginal prolapse    MVP (mitral valve prolapse)          Plan for disposition:possibly home later today    Chester Melton Jr., MD  04/26/17  7:17 AM

## 2017-04-26 NOTE — PROGRESS NOTES
Discharge Planning Assessment  [facility]     Patient Name: Veronica Mirza  MRN: [id number]  Today's Date: 4/26/2017    Admit Date: 4/25/2017          Discharge Needs Assessment       04/26/17 1154    Living Environment    Lives With spouse    Living Arrangements house   Home with basement    Provides Primary Care For no one    Primary Care Provided By spouse/significant other    Quality Of Family Relationships supportive    Able to Return to Prior Living Arrangements yes    Discharge Needs Assessment    Concerns To Be Addressed adjustment to diagnosis/illness concerns;basic needs concerns;no discharge needs identified    Readmission Within The Last 30 Days no previous admission in last 30 days    Anticipated Changes Related to Illness other (see comments)   Post op recuperation    Equipment Currently Used at Home walker, rolling;commode;bath bench   Not using walker.     Equipment Needed After Discharge none    Transportation Available car    Discharge Disposition home or self-care    Discharge Contact Information if Applicable [phone]            Discharge Plan       04/26/17 1156    Case Management/Social Work Plan    Plan Home    Patient/Family In Agreement With Plan yes    Additional Comments Anticipates discharge to home with no discharge planning needs identified.         Discharge Placement     No information found        Expected Discharge Date and Time     Expected Discharge Date Expected Discharge Time    Apr 26, 2017               Demographic Summary       04/26/17 1153    Referral Information    Admission Type observation    Referral Source admission list    Contact Information    Permission Granted to Share Information With     Primary Care Physician Information    Name Soo Vargasgess            Functional Status       04/26/17 1153    Functional Status Current    Ambulation 2-->assistive person    Transferring 2-->assistive person    Toileting 2-->assistive person    Bathing  0-->independent    Dressing 0-->independent    Eating 0-->independent    Communication 0-->understands/communicates without difficulty    Change in Functional Status Since Onset of Current Illness/Injury no    Functional Status Prior    Ambulation 0-->independent    Transferring 0-->independent    Toileting 0-->independent    Bathing 0-->independent    Dressing 0-->independent    Eating 0-->independent    Communication 0-->understands/communicates without difficulty    IADL    Medications independent    Meal Preparation independent    Housekeeping independent    Laundry independent    Shopping independent    Oral Care independent    Activity Tolerance    Current Activity Limitations other (see comments)   Post surgical restrictions    Usual Activity Tolerance excellent    Current Activity Tolerance excellent    Employment/Financial    Employment/Finance Comments Tells me she has Medicare/[facility], voices no concerns obtaining medications.             Psychosocial     None            Abuse/Neglect     None            Legal     None            Substance Abuse     None            Patient Forms     None          Julieta Suarez RN

## 2017-04-26 NOTE — PLAN OF CARE
Problem: Patient Care Overview (Adult)  Goal: Plan of Care Review  Outcome: Outcome(s) achieved Date Met:  04/26/17 04/26/17 1448   Outcome Evaluation   Outcome Summary/Follow up Plan pain under control without pain meds. voids without difficulty. no n/v noted. tolerated lunch. anticipation       Goal: Adult Individualization and Mutuality  Outcome: Outcome(s) achieved Date Met:  04/26/17  Goal: Discharge Needs Assessment  Outcome: Outcome(s) achieved Date Met:  04/26/17    Problem: Perioperative Period (Adult)  Goal: Signs and Symptoms of Listed Potential Problems Will be Absent or Manageable (Perioperative Period)  Outcome: Outcome(s) achieved Date Met:  04/26/17    Problem: Pain, Acute (Adult)  Goal: Identify Related Risk Factors and Signs and Symptoms  Outcome: Outcome(s) achieved Date Met:  04/26/17  Goal: Acceptable Pain Control/Comfort Level  Outcome: Outcome(s) achieved Date Met:  04/26/17

## 2017-04-26 NOTE — PLAN OF CARE
Problem: Patient Care Overview (Adult)  Goal: Plan of Care Review  Outcome: Ongoing (interventions implemented as appropriate)    04/26/17 0524   Coping/Psychosocial Response Interventions   Plan Of Care Reviewed With patient   Patient Care Overview   Progress progress toward functional goals as expected   Outcome Evaluation   Outcome Summary/Follow up Plan very minimal pain, rested well, no distress.       Goal: Adult Individualization and Mutuality  Outcome: Ongoing (interventions implemented as appropriate)  Goal: Discharge Needs Assessment  Outcome: Ongoing (interventions implemented as appropriate)    Problem: Pain, Acute (Adult)  Goal: Identify Related Risk Factors and Signs and Symptoms  Outcome: Ongoing (interventions implemented as appropriate)  Goal: Acceptable Pain Control/Comfort Level  Outcome: Ongoing (interventions implemented as appropriate)

## 2017-09-11 ENCOUNTER — OFFICE VISIT (OUTPATIENT)
Dept: ORTHOPEDIC SURGERY | Facility: CLINIC | Age: 77
End: 2017-09-11

## 2017-09-11 VITALS
WEIGHT: 128 LBS | HEIGHT: 66 IN | SYSTOLIC BLOOD PRESSURE: 151 MMHG | DIASTOLIC BLOOD PRESSURE: 74 MMHG | HEART RATE: 69 BPM | BODY MASS INDEX: 20.57 KG/M2

## 2017-09-11 DIAGNOSIS — M17.12 PRIMARY OSTEOARTHRITIS OF LEFT KNEE: Primary | ICD-10-CM

## 2017-09-11 PROCEDURE — 20610 DRAIN/INJ JOINT/BURSA W/O US: CPT | Performed by: ORTHOPAEDIC SURGERY

## 2017-09-11 PROCEDURE — 99213 OFFICE O/P EST LOW 20 MIN: CPT | Performed by: ORTHOPAEDIC SURGERY

## 2017-09-11 RX ORDER — BUPIVACAINE HYDROCHLORIDE 5 MG/ML
4 INJECTION, SOLUTION PERINEURAL
Status: COMPLETED | OUTPATIENT
Start: 2017-09-11 | End: 2017-09-11

## 2017-09-11 RX ORDER — BETAMETHASONE SODIUM PHOSPHATE AND BETAMETHASONE ACETATE 3; 3 MG/ML; MG/ML
6 INJECTION, SUSPENSION INTRA-ARTICULAR; INTRALESIONAL; INTRAMUSCULAR; SOFT TISSUE
Status: COMPLETED | OUTPATIENT
Start: 2017-09-11 | End: 2017-09-11

## 2017-09-11 RX ORDER — LIDOCAINE HYDROCHLORIDE 10 MG/ML
4 INJECTION, SOLUTION INFILTRATION; PERINEURAL
Status: COMPLETED | OUTPATIENT
Start: 2017-09-11 | End: 2017-09-11

## 2017-09-11 RX ADMIN — LIDOCAINE HYDROCHLORIDE 4 ML: 10 INJECTION, SOLUTION INFILTRATION; PERINEURAL at 15:12

## 2017-09-11 RX ADMIN — BUPIVACAINE HYDROCHLORIDE 4 ML: 5 INJECTION, SOLUTION PERINEURAL at 15:12

## 2017-09-11 RX ADMIN — BETAMETHASONE SODIUM PHOSPHATE AND BETAMETHASONE ACETATE 6 MG: 3; 3 INJECTION, SUSPENSION INTRA-ARTICULAR; INTRALESIONAL; INTRAMUSCULAR; SOFT TISSUE at 15:12

## 2017-09-11 NOTE — PROGRESS NOTES
[facility] Note    Subjective     Chief Complaint   Patient presents with   • Left Knee - Follow-up     5 months         HPI      Veronica Mirza is a 77 y.o. female.  She has a chronic history of knee arthritis.  She received an injection in April.  She is here for another left knee injection.  The pain is worse with walking the pain is better with rest.  Her pain is 5 out of 10.  She describes as throbbing stabbing.  Previous treatment has been injections        Past Medical History:   Diagnosis Date   • Glaucoma    • Irregular heartbeat     seen by dr Chase for years and then discharged from him around 2011; po meds prescribed by family md; no problems    • Macular degeneration    • MVP (mitral valve prolapse)     takes prophylactic antibiotics before dental procedures   • Pelvic prolapse     several procedures attempted    • Retinal and vitreous disorder     left eye-receives shots (10 so far)      Past Surgical History:   Procedure Laterality Date   • BLADDER REPAIR      x2   • BREAST BIOPSY Left     x2   • CATARACT EXTRACTION     • PARTIAL HYSTERECTOMY     • SACROCOLPOPEXY N/A 4/25/2017    Procedure: SACROCOLPOPEXY LAPAROSCOPIC WITH DAVINCI ROBOT;  Surgeon: Chester Melton Jr., MD;  Location: [facility];  Service:    • TOTAL KNEE ARTHROPLASTY Right    • TUBAL ABDOMINAL LIGATION        Family History   Problem Relation Age of Onset   • Hypertension Mother    • Osteoarthritis Mother      Social History     Social History   • Marital status:      Spouse name: N/A   • Number of children: N/A   • Years of education: N/A     Occupational History   • Not on file.     Social History Main Topics   • Smoking status: Never Smoker   • Smokeless tobacco: Never Used   • Alcohol use No   • Drug use: No   • Sexual activity: Defer     Other Topics Concern   • Not on file     Social History Narrative      Current Outpatient Prescriptions on File Prior to Visit   Medication Sig Dispense  "Refill   • Calcium-Iron-Vit D-Vit K (CALCIUM SOFT CHEWS) 413-2-3437-40 MG-UNT-MCG chewable tablet Chew 1 tablet 2 (Two) Times a Day.     • Cyanocobalamin (VITAMELTS ENERGY VITAMIN B-12) 1500 MCG tablet dispersible Take 1 tablet by mouth Daily.     • docusate sodium 100 MG capsule Take 100 mg by mouth 2 (Two) Times a Day As Needed for Constipation.  0   • Glucosamine-Chondroit-Vit C-Mn (GLUCOSAMINE CHONDR 1500 COMPLX PO) Take 1 tablet by mouth 3 (Three) Times a Day.     • HYDROcodone-acetaminophen (NORCO) 7.5-325 MG per tablet Take 1 tablet by mouth Every 6 (Six) Hours As Needed for Moderate Pain (4-6).  0   • metoprolol succinate XL (TOPROL-XL) 25 MG 24 hr tablet Take 25 mg by mouth Daily.     • Multiple Vitamins-Minerals (OCUVITE ADULT 50+) capsule Take 1 tablet by mouth Daily.     • nitrofurantoin, macrocrystal-monohydrate, (MACROBID) 100 MG capsule Take 1 capsule by mouth 2 (Two) Times a Day.  0     No current facility-administered medications on file prior to visit.       No Known Allergies     The following portions of the patient's history were reviewed and updated as appropriate: allergies, current medications, past family history, past medical history, past social history, past surgical history and problem list.    Review of Systems   Constitutional: Positive for activity change.   HENT: Negative.    Eyes: Negative.    Respiratory: Negative.    Cardiovascular: Negative.    Gastrointestinal: Negative.    Endocrine: Negative.    Genitourinary: Negative.    Musculoskeletal: Positive for arthralgias (knee pain) and joint swelling.   Skin: Negative.    Allergic/Immunologic: Negative.    Neurological: Negative.    Hematological: Negative.    Psychiatric/Behavioral: Negative.         Objective      Physical Exam  /74  Pulse 69  Ht 66\" (167.6 cm)  Wt 128 lb (58.1 kg)  BMI 20.66 kg/m2    Body mass index is 20.66 kg/(m^2).    General  Mental Status - alert  General Appearance - cooperative, well groomed, not " in acute distress  Orientation - Oriented X3  Build & Nutrition - well developed and well nourished  Posture - normal posture  Gait - Normal     Integumentary  Global Assessment  Examination of related systems reveals - no lymphadenopathy  General Characteristics  Overall examination of the patient's skin reveals - no rashes, no evidence of scars, no suspicious lesions and no bruises.  Color - normal coloration of skin.    Ortho Exam  Peripheral Vascular:    Upper Extremity:   Inspection:  Left--no cyanotic nail beds Right--no cyanotic nail beds   Bilateral:  Pink nail beds with brisk capillary refill   Palpation:  Bilateral radial pulse normal    Musculoskeletal:  Global Assessment:  Overall assessment of Lower Extremity Muscle Strength and Tone:  Left quadriceps--5/5   Left hamstrings--5/5       Left tibialis anterior--5/5  Left gastroc-soleus--5/5  Left EHL --5/5    Lower Extremity:  Knee/Patella:  No digital clubbing or cyanosis.    Examination of left knee reveals:  Normal deep tendon reflexes, coordination, strength, tone, sensation.  No known fractures or deformities.    Inspection and Palpation:  Left knee:  Tenderness:  Over the medial joint line and moderate severity  Effusion:  none  Crepitus:  Positive  Pulses:  2+  Ecchymosis:  None  Warmth:  None     ROM:  Right:  Extension:5    Flexion:120  Left:  Extension:5     Flexion:120    Instability:    Left:  Lachman Test:  Negative, Varus stress test negative, Valgus stress test negative    Deformities/Malalignments/Discrepancies:    Left:  Genu Varum   Right:  No deformity    Functional Testing:  Myra's test:  Negative  Patella grind test:  Positive  Q-angle:  normalPeripheral Vascular:    Upper Extremity:   Inspection:  Left--no cyanotic nail beds Right--no cyanotic nail beds   Bilateral:  Pink nail beds with brisk capillary refill   Palpation:  Bilateral radial pulse normal    Musculoskeletal:  Global Assessment:  Overall assessment of Lower Extremity  Muscle Strength and Tone:  Left quadriceps--5/5   Left hamstrings--5/5       Left tibialis anterior--5/5  Left gastroc-soleus--5/5  Left EHL --5/5    Lower Extremity:  Knee/Patella:  No digital clubbing or cyanosis.    Examination of left knee reveals:  Normal deep tendon reflexes, coordination, strength, tone, sensation.  No known fractures or deformities.    Inspection and Palpation:  Left knee:  Tenderness:  Over the medial joint line and moderate severity  Effusion:  none  Crepitus:  Positive  Pulses:  2+  Ecchymosis:  None  Warmth:  None     ROM:  Right:  Extension:5    Flexion:120  Left:  Extension:5     Flexion:120    Instability:    Left:  Lachman Test:  Negative, Varus stress test negative, Valgus stress test negative    Deformities/Malalignments/Discrepancies:    Left:  Genu Varum   Right:  No deformity    Functional Testing:  Myra's test:  Negative  Patella grind test:  Positive  Q-angle:  normal      Assessment/Plan      Veronica was seen today for follow-up.    Diagnoses and all orders for this visit:    Primary osteoarthritis of left knee    Other orders  -     Large Joint Arthrocentesis      Her previous records were reviewed from [facility] previous x-rays positive for osteoarthritis she has had good response to cortisone injections.  A left knee cortisone injection was performed today. she will follow-up prn    Medical Decision Making  Management Options : over-the-counter medicine and prescription/IM medicine  Data/Risk: summary of old records    Jc Pang MD  09/11/17  3:12 PM

## 2017-09-11 NOTE — PROGRESS NOTES
Procedure   Large Joint Arthrocentesis  Date/Time: 9/11/2017 3:12 PM  Consent given by: patient  Site marked: site marked  Timeout: Immediately prior to procedure a time out was called to verify the correct patient, procedure, equipment, support staff and site/side marked as required   Supporting Documentation  Indications: pain   Procedure Details  Location: knee - L knee  Preparation: Patient was prepped and draped in the usual sterile fashion  Needle size: 22 G  Approach: anterolateral  Medications administered: 4 mL bupivacaine; 4 mL lidocaine 1 %; 6 mg betamethasone acetate-betamethasone sodium phosphate 6 (3-3) MG/ML  Patient tolerance: patient tolerated the procedure well with no immediate complications

## 2019-03-21 NOTE — OP NOTE
DATE OF PROCEDURE:  04/25/2017    PREOPERATIVE DIAGNOSIS: Vaginal prolapse.    POSTOPERATIVE DIAGNOSIS: Vaginal prolapse.    PROCEDURES PERFORMED:  1. Robotic-assisted laparoscopic sacral colpopexy with mesh.   2. Cystoscopy.     SURGEON: Chester Melton Jr., MD     ANESTHESIA: General.     COMPLICATIONS: None.     ESTIMATED BLOOD LOSS: Minimal.     DISPOSITION: The patient was taken to the recovery room in stable condition with Harper catheter and vaginal pack in place.     INDICATIONS: This is a pleasant 76-year-old female who presents with symptomatic pelvic prolapse. On urodynamics testing, she did have mild stress urinary incontinence at bladder capacity; however, because of a significant postvoid residual we elected against suburethral sling at the time of her pelvic prolapse surgery with the understanding that we may need to treat urinary incontinence down the road. The patient understands this and wishes to proceed with robotic-assisted laparoscopic sacral colpopexy with mesh and gives her full consent.     DESCRIPTION OF PROCEDURE: After consent was obtained, the patient was taken to the operating suite where she was placed in the supine position. She was sterilely prepped and draped in the normal fashion. Veress needle technique was used at the umbilicus to establish pneumoperitoneum. A 12 mm blunt Visiport was then also used at the umbilicus to create a camera trocar site. Under direct vision then, the remainder of the robotic and assistant trocars were placed. The patient was placed in steep Trendelenburg position. The robot was docked. We began the operation by reflecting the bowel out of the pelvic cavity. This exposed our sacral promontory quite nicely and we exposed the anterior spinal ligament as it coursed over the sacral promontory and along the sacrum. We opened the posterior peritoneum all the way down to the cul-de-sac. At this point, the vaginal retractor was used to bring the vaginal cuff  into the abdomen. It was noted that the bladder had adhered to the apex of the vagina; however, we were able to establish a plane between the posterior aspect of the bladder, anterior aspect of the vagina, and we dissected out this plane quite nicely. The Y mesh was then fashioned to fit the apex of the vagina and we pexed the Y mesh onto the apex of the vagina using interrupted [place]-Joshua suture for a total of 6 interrupted sutures. At this point, we identified the anterior spinal ligament and _____ the single arm of the mesh to the anterior spinal ligament as it coursed over the sacral promontory without any difficulty. We did note that the vagina was quite foreshortened and we did use a long piece of intervening mesh. At this point, we extra peritonealized the mesh by bringing the posterior peritoneum over the mesh using a V-Loc suture. Once the mesh was completely extra peritonealized a cystoscopy was performed. Methylene blue had been given to the patient and was able to see methylene blue from both ureteral orifices. There was no injury to the urinary bladder posterior wall where we had dissected and created a plane between the anterior vagina and posterior bladder. At this point, a Harper catheter was replaced and a vaginal pack impregnated with estrogen cream placed into the vagina. Under laparoscopic guidance, we removed all of our trocars and we closed the fascial defect at the assistant and camera trocar sites. All of the incisions were irrigated. Subcutaneous tissues were brought together using 3-0 Vicryl. The skin was brought together using Dermabond. At this point, the patient was returned to the supine position and anesthesia was reversed. She was taken to the recovery room in stable condition.      Chester Melton Jr., MD TKS/darrion  DD: 04/25/2017 18:04:23  DT: 04/25/2017 19:35:29  Voice Rec. ID #[id number]  Voice Original ID #[id number]  Doc ID #[id number]  Rev. #0  cc:     yes...

## (undated) DEVICE — CYSTO/BLADDER IRRIGATION SET, REGULATING CLAMP

## (undated) DEVICE — SUT VIC 2/0 SH 27IN

## (undated) DEVICE — GLV SURG SENSICARE W/ALOE PF LF 7.5 STRL

## (undated) DEVICE — TOTAL TRAY, 16FR 10ML SIL FOLEY, URN: Brand: MEDLINE

## (undated) DEVICE — VISUALIZATION SYSTEM: Brand: CLEARIFY

## (undated) DEVICE — ST TBG AIRSEAL FLTR TRI LUM

## (undated) DEVICE — Device

## (undated) DEVICE — LEGGINGS, PAIR, 29X43, STERILE: Brand: MEDLINE

## (undated) DEVICE — DRAPE,UNDERBUTTOCKS,STERILE: Brand: MEDLINE

## (undated) DEVICE — ANTIBACTERIAL UNDYED BRAIDED (POLYGLACTIN 910), SYNTHETIC ABSORBABLE SUTURE: Brand: COATED VICRYL

## (undated) DEVICE — ST. VAGINAL PACKING X-RAY DETECTABLE: Brand: DEROYAL

## (undated) DEVICE — TROC ANCHORPORT BLADELES LP 8X120MM

## (undated) DEVICE — OBT BLADLES ENDOWRIST DAVINCI/S 8MM

## (undated) DEVICE — SKIN AFFIX SURG ADHESIVE 72/CS 0.55ML: Brand: MEDLINE

## (undated) DEVICE — SUT GORE THX26 CV2 36IN 2N05A

## (undated) DEVICE — PK UROL DAVINCI 10

## (undated) DEVICE — SUT VIC 0 UR6 27IN VCP603H

## (undated) DEVICE — INTENDED FOR TISSUE SEPARATION, AND OTHER PROCEDURES THAT REQUIRE A SHARP SURGICAL BLADE TO PUNCTURE OR CUT.: Brand: BARD-PARKER ® STAINLESS STEEL BLADES

## (undated) DEVICE — IRRIGATOR TOOMEY 70CC

## (undated) DEVICE — TIP COVER ACCESSORY

## (undated) DEVICE — ENDOPATH XCEL BLADELESS TROCARS WITH STABILITY SLEEVES: Brand: ENDOPATH XCEL

## (undated) DEVICE — SYR LL TP 10ML STRL